# Patient Record
Sex: MALE | Race: BLACK OR AFRICAN AMERICAN | NOT HISPANIC OR LATINO | URBAN - METROPOLITAN AREA
[De-identification: names, ages, dates, MRNs, and addresses within clinical notes are randomized per-mention and may not be internally consistent; named-entity substitution may affect disease eponyms.]

---

## 2017-09-27 ENCOUNTER — OFFICE (OUTPATIENT)
Dept: URBAN - METROPOLITAN AREA CLINIC 78 | Facility: CLINIC | Age: 22
End: 2017-09-27

## 2017-09-27 VITALS
HEART RATE: 105 BPM | TEMPERATURE: 99.8 F | SYSTOLIC BLOOD PRESSURE: 133 MMHG | WEIGHT: 65 LBS | DIASTOLIC BLOOD PRESSURE: 96 MMHG | HEIGHT: 62 IN

## 2017-09-27 DIAGNOSIS — R10.11 RIGHT UPPER QUADRANT PAIN: ICD-10-CM

## 2017-09-27 DIAGNOSIS — K21.9 GASTRO-ESOPHAGEAL REFLUX DISEASE WITHOUT ESOPHAGITIS: ICD-10-CM

## 2017-09-27 PROCEDURE — 99244 OFF/OP CNSLTJ NEW/EST MOD 40: CPT

## 2017-09-27 RX ORDER — PANTOPRAZOLE SODIUM 40 MG/1
TABLET, DELAYED RELEASE ORAL
Qty: 90 | Refills: 3 | Status: COMPLETED
End: 2021-07-26 | Stop reason: SDUPTHER

## 2017-09-27 RX ORDER — POLYETHYLENE GLYCOL 3350 17 G/17G
POWDER, FOR SOLUTION ORAL
Qty: 1 | Refills: 5 | Status: ACTIVE
Start: 2017-09-27

## 2018-11-08 ENCOUNTER — OFFICE (OUTPATIENT)
Dept: URBAN - METROPOLITAN AREA CLINIC 33 | Facility: CLINIC | Age: 23
End: 2018-11-08
Payer: MEDICAID

## 2018-11-08 VITALS — WEIGHT: 65 LBS | HEIGHT: 62 IN | TEMPERATURE: 97.7 F

## 2018-11-08 DIAGNOSIS — K21.9 GASTRO-ESOPHAGEAL REFLUX DISEASE WITHOUT ESOPHAGITIS: ICD-10-CM

## 2018-11-08 PROCEDURE — 99214 OFFICE O/P EST MOD 30 MIN: CPT

## 2018-11-08 RX ORDER — PANTOPRAZOLE SODIUM 40 MG/1
TABLET, DELAYED RELEASE ORAL
Qty: 90 | Refills: 3 | Status: COMPLETED
End: 2021-07-26 | Stop reason: SDUPTHER

## 2020-04-21 ENCOUNTER — TELEHEALTH PROVIDED OTHER THAN IN PATIENT'S HOME (OUTPATIENT)
Dept: URBAN - METROPOLITAN AREA TELEHEALTH 3 | Facility: TELEHEALTH | Age: 25
End: 2020-04-21
Payer: MEDICAID

## 2020-04-21 VITALS — HEIGHT: 62 IN

## 2020-04-21 DIAGNOSIS — K21.9 GASTRO-ESOPHAGEAL REFLUX DISEASE WITHOUT ESOPHAGITIS: ICD-10-CM

## 2020-04-21 PROCEDURE — 99214 OFFICE O/P EST MOD 30 MIN: CPT | Mod: 95 | Performed by: INTERNAL MEDICINE

## 2020-04-21 RX ORDER — PANTOPRAZOLE SODIUM 40 MG/1
TABLET, DELAYED RELEASE ORAL
Qty: 90 | Refills: 3 | Status: COMPLETED
End: 2021-07-26 | Stop reason: SDUPTHER

## 2020-04-21 NOTE — SERVICENOTES
Duration of visit was 30 min.  Patient's visit was conducted through zoom telecommunication. Patient consented before the start of visit as to understanding of privacy concerns, possible technological failure, and their responsibility of carrying out instructions of plan.

## 2020-04-21 NOTE — SERVICEHPINOTES
PATIENT VERIFIED BY DATE OF BIRTH AND NAME. Patient has been consented for this telecommunication visit. Entire visit was done in presence of his mother.  Patient has CP so mother asked him questions I posed and then let me know his response.  Has been doing well on pantoprazole 40 mg per day.  No further abdominal pain episodes.  NO abdominal pain, nausea, or emesis.  Good po intake. Has not complained of stomach pain.  Would like a refill of medication.BRRecently started gabapentin for back pain, has not cuased any issues.DANDY asked his mother the following ROS:Allergic/Immunologic: Denies eye irritation, reactions, sneezing. BRCardiovascular: Denies palpitation/fluttering of heart, pain, shortness of breath while exercising. BRENMT: Denies blurred vision, irritation from light, itching, nose blocked, painful eyes, post nasal drip, pressure in ears, rhinitis (running nose), sores in mouth, teeth hurt. BREndocrine: Denies cold intolerance, hair loss/growth, heat intolerance, hot flashes. BRGastrointestinal:Denies constipation, diarrhea, reflux (heartburn), rectal bleeding. BRGenitourinary: Denies hesitation when urinating, urination at night. BRHematologic/Lymphatic: Denies bleeds easily, night sweats, weight loss. BRIntegumentary: Denies bleeding, dry skin, itchy skin, lesions, rash. BRMusculoskeletal:Denies soreness, weakness. BRNeurological: Denies abnormal movements, dizziness/vertigo, fainting, ringing in ears, twitch. BRPsychiatric: Denies Anxiety, depression, Loss of sleep, mood swings, situational stress. BRRespiratory: Denies cough, shortness of breath while sitting, wheezing

## 2021-09-07 ENCOUNTER — TELEHEALTH PROVIDED OTHER THAN IN PATIENT'S HOME (OUTPATIENT)
Dept: URBAN - METROPOLITAN AREA TELEHEALTH 3 | Facility: TELEHEALTH | Age: 26
End: 2021-09-07
Payer: COMMERCIAL

## 2021-09-07 VITALS — HEIGHT: 62 IN | WEIGHT: 65 LBS

## 2021-09-07 DIAGNOSIS — K21.9 GASTRO-ESOPHAGEAL REFLUX DISEASE WITHOUT ESOPHAGITIS: ICD-10-CM

## 2021-09-07 PROCEDURE — 99213 OFFICE O/P EST LOW 20 MIN: CPT | Mod: 95 | Performed by: INTERNAL MEDICINE

## 2021-09-07 RX ORDER — PANTOPRAZOLE 40 MG/1
TABLET, DELAYED RELEASE ORAL
Qty: 90 | Refills: 3 | Status: ACTIVE

## 2021-09-07 NOTE — SERVICEHPINOTES
PATIENT VERIFIED BY DATE OF BIRTH AND NAME. Patient has been consented for this telecommunication visit. Entire visit was done in presence of his mother. Patient has CP so mother asked him questions I posed and then let me know his response. Has been doing well on pantoprazole 40 mg per day. No further abdominal pain episodes. NO abdominal pain, nausea, or emesis. Good po intake. Has not complained of stomach pain.Nakia asked his mother the following ROS:Allergic/Immunologic: Denies eye irritation, reactions, sneezing.brCardiovascular: Denies palpitation/fluttering of heart, pain, shortness of breath while exercising.brENMT: Denies blurred vision, irritation from light, itching, nose blocked, painful eyes, post nasal drip, pressure in ears, rhinitis (running nose), sores in mouth, teeth hurt.brEndocrine: Denies cold intolerance, hair loss/growth, heat intolerance, hot flashes.brGastrointestinal:Denies constipation, diarrhea, reflux (heartburn), rectal bleeding.brGenitourinary: Denies hesitation when urinating, urination at night.brHematologic/Lymphatic: Denies bleeds easily, night sweats, weight loss.brIntegumentary: Denies bleeding, dry skin, itchy skin, lesions, rash.brMusculoskeletal:Denies soreness, weakness.brNeurological: Denies abnormal movements, dizziness/vertigo, fainting, ringing in ears, twitch.brPsychiatric: Denies Anxiety, depression, Loss of sleep, mood swings, situational stress.brRespiratory: Denies cough, shortness of breath while sitting, wheezingTakes pantoprazole daily which has controlled his heartburn verywell.  Says no longer having heartburn.  Eating well.  No nausea, emesis, melena.

## 2022-11-22 ENCOUNTER — OFFICE (OUTPATIENT)
Dept: URBAN - METROPOLITAN AREA CLINIC 79 | Facility: CLINIC | Age: 27
End: 2022-11-22
Payer: MEDICAID

## 2022-11-22 VITALS — WEIGHT: 67 LBS | HEIGHT: 62 IN | TEMPERATURE: 97.9 F

## 2022-11-22 DIAGNOSIS — K21.9 GASTRO-ESOPHAGEAL REFLUX DISEASE WITHOUT ESOPHAGITIS: ICD-10-CM

## 2022-11-22 PROCEDURE — 99213 OFFICE O/P EST LOW 20 MIN: CPT | Performed by: INTERNAL MEDICINE

## 2022-11-22 RX ORDER — PANTOPRAZOLE 40 MG/1
TABLET, DELAYED RELEASE ORAL
Qty: 90 | Refills: 3 | Status: ACTIVE

## 2023-09-18 ENCOUNTER — OFFICE (OUTPATIENT)
Dept: URBAN - METROPOLITAN AREA CLINIC 79 | Facility: CLINIC | Age: 28
End: 2023-09-18
Payer: COMMERCIAL

## 2023-09-18 VITALS
HEIGHT: 62 IN | DIASTOLIC BLOOD PRESSURE: 99 MMHG | TEMPERATURE: 97.7 F | SYSTOLIC BLOOD PRESSURE: 130 MMHG | HEART RATE: 97 BPM | WEIGHT: 70 LBS

## 2023-09-18 DIAGNOSIS — K59.09 OTHER CONSTIPATION: ICD-10-CM

## 2023-09-18 DIAGNOSIS — K21.9 GASTRO-ESOPHAGEAL REFLUX DISEASE WITHOUT ESOPHAGITIS: ICD-10-CM

## 2023-09-18 PROCEDURE — 99214 OFFICE O/P EST MOD 30 MIN: CPT | Performed by: INTERNAL MEDICINE

## 2023-09-18 RX ORDER — POLYETHYLENE GLYCOL 3350 17 G/17G
POWDER, FOR SOLUTION ORAL
Qty: 1 | Refills: 5 | Status: ACTIVE
Start: 2023-09-18

## 2023-09-18 RX ORDER — PANTOPRAZOLE 40 MG/1
TABLET, DELAYED RELEASE ORAL
Qty: 90 | Refills: 3 | Status: ACTIVE

## 2024-06-12 ENCOUNTER — OFFICE VISIT (OUTPATIENT)
Dept: URGENT CARE | Facility: URGENT CARE | Age: 29
End: 2024-06-12
Payer: COMMERCIAL

## 2024-06-12 VITALS
HEART RATE: 89 BPM | OXYGEN SATURATION: 94 % | DIASTOLIC BLOOD PRESSURE: 70 MMHG | TEMPERATURE: 98.8 F | HEIGHT: 60 IN | SYSTOLIC BLOOD PRESSURE: 107 MMHG | RESPIRATION RATE: 24 BRPM | BODY MASS INDEX: 13.35 KG/M2 | WEIGHT: 68 LBS

## 2024-06-12 DIAGNOSIS — M25.412 PAIN AND SWELLING OF LEFT SHOULDER: Primary | ICD-10-CM

## 2024-06-12 DIAGNOSIS — M25.476 SWELLING OF FOOT JOINT, UNSPECIFIED LATERALITY: ICD-10-CM

## 2024-06-12 DIAGNOSIS — Z86.69 HISTORY OF CEREBRAL PALSY: ICD-10-CM

## 2024-06-12 DIAGNOSIS — M25.512 PAIN AND SWELLING OF LEFT SHOULDER: Primary | ICD-10-CM

## 2024-06-12 DIAGNOSIS — Z51.81 THERAPEUTIC DRUG MONITORING: ICD-10-CM

## 2024-06-12 PROBLEM — M24.512: Chronic | Status: ACTIVE | Noted: 2019-11-01

## 2024-06-12 PROBLEM — M24.529 CONTRACTURE OF UPPER ARM JOINT: Status: ACTIVE | Noted: 2019-11-01

## 2024-06-12 PROBLEM — K11.7 DISTURBANCE OF SALIVARY SECRETION: Status: ACTIVE | Noted: 2024-06-12

## 2024-06-12 PROBLEM — G40.909 EPILEPSY (CMS/HCC): Status: ACTIVE | Noted: 2023-06-25

## 2024-06-12 PROBLEM — M24.511: Chronic | Status: ACTIVE | Noted: 2019-11-01

## 2024-06-12 LAB
ALBUMIN SERPL-MCNC: 3.7 G/DL (ref 3.3–5.5)
ALP SERPL-CCNC: 62 U/L (ref 45–115)
ALT SERPL-CCNC: 15 U/L (ref 10–47)
ANION GAP SERPL CALC-SCNC: 12 MMOL/L (ref 3–11)
AST SERPL-CCNC: 31 U/L
BASOPHILS # BLD AUTO: 0.01 10*3/UL
BASOPHILS NFR BLD AUTO: 0.2 % (ref 0–2)
BILIRUB SERPL-MCNC: 0.7 MG/DL (ref 0.2–1.4)
BUN SERPL-MCNC: 9 MG/DL (ref 7–25)
CALCIUM ALBUM COR SERPL-MCNC: 10 MG/DL (ref 8.6–10.3)
CALCIUM SERPL-MCNC: 9.8 MG/DL (ref 8.6–10.3)
CHLORIDE SERPL-SCNC: 101 MMOL/L (ref 98–107)
CO2 SERPL-SCNC: 32 MMOL/L (ref 21–32)
CREAT SERPL-MCNC: 0.4 MG/DL (ref 0.7–1.3)
CRP SERPL-MCNC: 2.6 MG/L
EGFRCR SERPLBLD CKD-EPI 2021: 153 ML/MIN/1.73M*2
EOSINOPHIL # BLD AUTO: 0.31 10*3/UL
EOSINOPHIL NFR BLD AUTO: 4.5 % (ref 0–3)
ERYTHROCYTE [DISTWIDTH] IN BLOOD BY AUTOMATED COUNT: 13.1 % (ref 11.5–15)
ERYTHROCYTE [SEDIMENTATION RATE] IN BLOOD: 14 MM/HR
GLUCOSE SERPL-MCNC: 83 MG/DL (ref 70–105)
HCT VFR BLD AUTO: 43 % (ref 38–50)
HGB BLD-MCNC: 13.9 G/DL (ref 13.2–17.2)
LYMPHOCYTES # BLD AUTO: 2.56 10*3/UL
LYMPHOCYTES NFR BLD AUTO: 37.4 % (ref 15–47)
MCH RBC QN AUTO: 28.3 PG (ref 29–34)
MCHC RBC AUTO-ENTMCNC: 32.4 G/DL (ref 32–36)
MCV RBC AUTO: 87.2 FL (ref 82–97)
MONOCYTES # BLD AUTO: 0.66 10*3/UL
MONOCYTES NFR BLD AUTO: 9.7 % (ref 5–13)
NEUTROPHILS # BLD AUTO: 3.3 10*3/UL
NEUTROPHILS NFR BLD AUTO: 48.2 % (ref 46–70)
PLATELET # BLD AUTO: 246 10*3/UL (ref 130–350)
PMV BLD AUTO: 7.5 FL (ref 6.9–10.8)
POTASSIUM SERPL-SCNC: 4.4 MMOL/L (ref 3.5–5.1)
PROT SERPL-MCNC: 7.3 G/DL (ref 6.4–8.1)
RBC # BLD AUTO: 4.93 10*6/ΜL (ref 4.1–5.8)
SODIUM SERPL-SCNC: 145 MMOL/L (ref 128–145)
VALPROATE SERPL-MCNC: 94 UG/ML (ref 50–125)
WBC # BLD AUTO: 6.9 10*3/UL (ref 3.7–9.6)

## 2024-06-12 PROCEDURE — 85652 RBC SED RATE AUTOMATED: CPT | Performed by: NURSE PRACTITIONER

## 2024-06-12 PROCEDURE — 80053 COMPREHEN METABOLIC PANEL: CPT | Performed by: NURSE PRACTITIONER

## 2024-06-12 PROCEDURE — 86140 C-REACTIVE PROTEIN: CPT | Performed by: NURSE PRACTITIONER

## 2024-06-12 PROCEDURE — 85025 COMPLETE CBC W/AUTO DIFF WBC: CPT | Performed by: NURSE PRACTITIONER

## 2024-06-12 PROCEDURE — 99204 OFFICE O/P NEW MOD 45 MIN: CPT | Performed by: NURSE PRACTITIONER

## 2024-06-12 PROCEDURE — 36415 COLL VENOUS BLD VENIPUNCTURE: CPT | Performed by: NURSE PRACTITIONER

## 2024-06-12 PROCEDURE — 80164 ASSAY DIPROPYLACETIC ACD TOT: CPT | Performed by: NURSE PRACTITIONER

## 2024-06-12 RX ORDER — ACETAMINOPHEN 500 MG
1000 TABLET ORAL DAILY
COMMUNITY

## 2024-06-12 RX ORDER — MIDAZOLAM 5 MG/.1ML
1 SPRAY NASAL
COMMUNITY
Start: 2023-06-22

## 2024-06-12 RX ORDER — GABAPENTIN 100 MG/1
200 CAPSULE ORAL 2 TIMES DAILY
COMMUNITY
Start: 2024-05-10

## 2024-06-12 RX ORDER — MONTELUKAST SODIUM 5 MG/1
5 TABLET, CHEWABLE ORAL NIGHTLY
COMMUNITY
Start: 2024-05-31

## 2024-06-12 RX ORDER — TRIPROLIDINE/PSEUDOEPHEDRINE 2.5MG-60MG
250 TABLET ORAL ONCE
Status: COMPLETED | OUTPATIENT
Start: 2024-06-12 | End: 2024-06-12

## 2024-06-12 RX ORDER — LEVETIRACETAM 500 MG/1
250 TABLET ORAL 2 TIMES DAILY
COMMUNITY
Start: 2023-10-28

## 2024-06-12 RX ORDER — BACLOFEN 20 MG/1
30 TABLET ORAL 3 TIMES DAILY
COMMUNITY
Start: 2023-11-28

## 2024-06-12 RX ORDER — DIVALPROEX SODIUM 125 MG/1
250 CAPSULE, COATED PELLETS ORAL 2 TIMES DAILY
COMMUNITY
Start: 2023-07-31

## 2024-06-12 RX ORDER — DIAZEPAM 2 MG/1
1 TABLET ORAL 2 TIMES DAILY
COMMUNITY
Start: 2024-01-29

## 2024-06-12 RX ORDER — PANTOPRAZOLE SODIUM 40 MG/1
40 TABLET, DELAYED RELEASE ORAL DAILY
COMMUNITY

## 2024-06-12 RX ADMIN — Medication 250 MG: at 15:15

## 2024-06-12 NOTE — PATIENT INSTRUCTIONS
1.  Patient's labs are stable so far.  - CBC does not reveal a significant elevation in white count.  This is reassuring in terms of assessing for infection.  - Metabolic panel shows stable kidney function,  Liver function, and electrolytes.   -Depakote level is 94, which is normal.  -Inflammatory markers are normal    The exact cause of patient's symptoms is unknown at this time.  I am reassured by his workup thus far.  - Recommend treating the inflammation and pain with NSAIDs.  Patient was given a dose of ibuprofen today at 3:20 P.M. continue with this every 4-6 hours for the next few days  - May also do cool compresses to the affected areas.  - Patient may also consider taking some Zyrtec, chewable children's tablet once to twice a day as this inflammation may be reactive in nature as well    Patient develops any fever, vomiting, respiratory symptoms, changes in mentation/mood/activity, is not eating or drinking,  recommend patient be reevaluated in the clinic setting.  You guys are more than welcome to come back into urgent care for reevaluation any point.

## 2024-06-12 NOTE — PROGRESS NOTES
Subjective   Chief Complaint   Patient presents with    Pain     19  [place]; pt from [place] w/ hx of spastic quadriplegic CP; pt is nonverbal ; per Jany/Haritha(mom) pt c/o left shoulder pain and some redness on the shoulder joint area as of yesterday ; also has some right foot swelling on the top padding and is slightly red and swollen; pt can give physical commands to let people know for Yes' or no answers; this morning noticed left foot swelling; [place] since then and did take him in for a right hand pain issue but that's resolved;         HPI    Hernesto Blakely is a 28 y.o. male who presents for skin/joint redness and concerns for infection. Patient nonverbal with history of CP, wheelchair bound, and mother at bedside to assist with history and communication today.  Patient's mother states that she noticed some localized swelling and redness to the dorsal aspect of patient's right foot a couple of days ago.  She states that she also noticed a spot to the anterior left shoulder that appeared red and swollen and patient complained that it was hurting.  Yesterday she noticed some similar swelling appearing to the left dorsal aspect of the distal foot.  Patient wears a shoe to the right foot but does not wear one to the left, citing that he typically will use the joystick on his wheelchair with his left foot.  She states patient has not sustained any traumatic injury to these areas, and he is wheelchair-bound.  She is unsure the exact cause of the symptoms he has no fever or respiratory symptoms.  Patient's  appetite and behavior overall has been normal per his baseline.     A couple of weeks ago, patient had similar swelling to the right hand, and he was seen in the clinic setting where he had x-rays performed.  There was no evidence of fracture or underlying issue.  They were concerned for possible cellulitis, but patient was not treated with antibiotics and symptoms seemed to spontaneously  resolve.     Patient has had no breakthrough seizures activity. Patient recently had increase of his Depakote, and patient's mother wonders if we can check this level.  They have decreased his Keppra and subsequently had increased this drug level.  Additionally, he is on baclofen, Valium, gabapentin.    Patient and family are vacationing from [place]. Some of his medical records are available for review.     The following have been reviewed and updated as appropriate in this visit:   Problems         Allergies   Allergen Reactions    Bee Pollen Itching    Zoloft [Sertraline]      Night sweats, body shaking, trouble sleeping     Current Outpatient Medications   Medication Sig Dispense Refill    baclofen (LIORESAL) 20 mg tablet Take 1.5 tablets (30 mg total) by mouth 3 times daily      cholecalciferol, vitamin D3, 50 mcg (2,000 unit) capsule Take 1,000 Int'l Units by mouth daily      diazePAM (VALIUM) 2 mg tablet Take 0.5 tablets (1 mg total) by mouth 2 times daily      divalproex sprinkle (DEPAKOTE SPRINKLE) 125 mg DR capsule Take 2 capsules (250 mg total) by mouth 2 times daily      gabapentin (NEURONTIN) 100 mg capsule Take 2 capsules (200 mg total) by mouth 2 times daily      levETIRAcetam (KEPPRA) 500 mg tablet Take 0.5 tablets (250 mg total) by mouth 2 times daily      midazolam (Nayzilam) 5 mg/spray (0.1 mL) spray,non-aerosol Administer 1 spray into nostril(s)      montelukast (SINGULAIR) 5 mg chewable tablet Take 1 tablet (5 mg total) by mouth nightly      pantoprazole (PROTONIX) 40 mg EC tablet Take 1 tablet (40 mg total) by mouth daily       No current facility-administered medications for this visit.     Past Medical History:   Diagnosis Date    CP (cerebral palsy), spastic (CMS/HCC)      History reviewed. No pertinent surgical history.  Family History   Adopted: Yes   Family history unknown: Yes     Social History     Socioeconomic History    Marital status: Unknown   Tobacco Use    Smoking status: Never     Smokeless tobacco: Never   Vaping Use    Vaping status: Never Used   Substance and Sexual Activity    Alcohol use: Never    Drug use: Never     Social Determinants of Health     Tobacco Use: Low Risk  (6/12/2024)    Patient History     Smoking Tobacco Use: Never     Smokeless Tobacco Use: Never   Recent Concern: Tobacco Use - High Risk (5/10/2024)    Received from [facility]    Patient History     Smoking Tobacco Use: Never     Smokeless Tobacco Use: Current     Passive Exposure: Never   Alcohol Use: Patient Declined (4/2/2024)    Received from [facility]    AUDIT-C     Frequency of Alcohol Consumption: Patient declined     Average Number of Drinks: Patient declined     Frequency of Binge Drinking: Patient declined   Financial Resource Strain: Patient Declined (4/2/2024)    Received from [facility]    Overall Financial Resource Strain (CARDIA)     Difficulty of Paying Living Expenses: Patient declined   Food Insecurity: Patient Declined (4/2/2024)    Received from [facility]    Hunger Vital Sign     Worried About Running Out of Food in the Last Year: Patient declined     Ran Out of Food in the Last Year: Patient declined   Transportation Needs: Patient Declined (4/2/2024)    Received from [facility]    PRAPARE - Transportation     Lack of Transportation (Medical): Patient declined     Lack of Transportation (Non-Medical): Patient declined   Physical Activity: Unknown (4/2/2024)    Received from [facility]    Exercise Vital Sign     Days of Exercise per Week: Patient declined   Stress: Patient Declined (4/2/2024)    Received from [facility]    [employer] [place] [employer] - Occupational Stress Questionnaire     Feeling of Stress : Patient declined   Social Connections: Patient Declined (4/2/2024)    Received  from [facility]    Social Connection and Isolation Panel [NHANES]     Frequency of Communication with Friends and Family: Patient declined     Frequency of Social Gatherings with Friends and Family: Patient declined     Attends Anabaptist Services: Patient declined     Active Member of Clubs or Organizations: Patient declined     Attends Club or Organization Meetings: Patient declined     Marital Status: Patient declined   Depression: Not at risk (4/2/2024)    Received from [facility] and [facility]    PHQ-2     PHQ2 Score: 0   Utilities: Patient Declined (4/2/2024)    Received from [facility] Utilities     Threatened with loss of utilities: Patient declined       Review of Systems  Please see HPI    Objective     Vitals:  /70   Pulse 89   Temp 37.1 °C (98.8 °F) (Temporal)   Resp 24   Ht 1.524 m (5')   Wt 30.8 kg (68 lb)   SpO2 94%   BMI 13.28 kg/m²     Physical Exam  Vitals and nursing note reviewed.   Constitutional:       General: He is not in acute distress.     Appearance: He is not ill-appearing or toxic-appearing.      Comments: Patient sitting in wheechair. He is awake and alert, responding to conversations and physically responding to mother's questions. He has spasticity to the extremities. He is in no apparent acute distress, well hydrated appearing.    Cardiovascular:      Rate and Rhythm: Normal rate and regular rhythm.      Pulses: Normal pulses.      Heart sounds: Normal heart sounds.   Skin:     General: Skin is warm and dry.          Neurological:      Mental Status: He is alert. Mental status is at baseline.         Recent Results (from the past 4 hour(s))   Valproic acid level, total Blood, Venous    Collection Time: 06/12/24  2:11 PM   Result Value Ref Range    Valproic Acid, Total 94 50 - 125 ug/mL   CBC w/auto differential Blood, Venous    Collection Time: 06/12/24  2:11 PM   Result Value Ref Range    WBC  6.9 3.7 - 9.6 10*3/uL    RBC 4.93 4.10 - 5.80 10*6/µL    Hemoglobin 13.9 13.2 - 17.2 g/dL    Hematocrit 43.0 38.0 - 50.0 %    MCV 87.2 82.0 - 97.0 fL    MCH 28.3 (L) 29.0 - 34.0 pg    MCHC 32.4 32.0 - 36.0 g/dL    RDW 13.1 11.5 - 15.0 %    Platelets 246 130 - 350 10*3/uL    MPV 7.5 6.9 - 10.8 fL    Neutrophils% 48.2 46.0 - 70.0 %    Lymphocytes% 37.4 15.0 - 47.0 %    Monocytes% 9.7 5.0 - 13.0 %    Eosinophils% 4.5 (H) 0.0 - 3.0 %    Basophils% 0.2 0.0 - 2.0 %    ANC (auto diff) 3.30 10*3/UL    Lymphocytes Absolute 2.56 10*3/uL    Monocytes Absolute 0.66 10*3/uL    Eosinophils Absolute 0.31 10*3/uL    Basophils Absolute 0.01 10*3/uL   Comprehensive metabolic panel Blood, Venous    Collection Time: 06/12/24  2:11 PM   Result Value Ref Range    Sodium 145 128 - 145 mmol/L    Potassium 4.4 3.5 - 5.1 MMOL/L    Chloride 101 98 - 107 mmol/L    CO2 32 21 - 32 mmol/L    Anion Gap 12 (H) 3 - 11 mmol/L    BUN 9 7 - 25 mg/dL    Creatinine 0.40 (L) 0.70 - 1.30 mg/dL    Glucose 83 70 - 105 mg/dL    Calcium 9.8 8.6 - 10.3 mg/dL    AST 31 <40 U/L    ALT (SGPT) 15 10 - 47 U/L    Alkaline Phosphatase 62 45 - 115 U/L    Total Protein 7.3 6.4 - 8.1 g/dL    Albumin 3.7 3.3 - 5.5 g/dL    Total Bilirubin 0.70 0.20 - 1.40 mg/dL    Corrected Calcium 10.0 8.6 - 10.3 mg/dL    eGFR 153 >60 mL/min/1.73m*2   Sedimentation rate, automated Blood, Venous    Collection Time: 06/12/24  2:11 PM   Result Value Ref Range    Sed Rate 14 <=20 mm/hr   C-reactive protein (Inflammation) Blood, Venous    Collection Time: 06/12/24  2:11 PM   Result Value Ref Range    CRP 2.6 <=10.0 MG/L           Assessment/Plan   Diagnoses and all orders for this visit:    Pain and swelling of left shoulder  -     Valproic acid level, total Blood, Venous  -     CBC w/auto differential Blood, Venous  -     Comprehensive metabolic panel Blood, Venous  -     Sedimentation rate, automated Blood, Venous  -     C-reactive protein (Inflammation) Blood, Venous  -     ibuprofen  (ADVIL,MOTRIN) 100 mg/5 mL suspension 250 mg    Swelling of foot joint, unspecified laterality  -     Valproic acid level, total Blood, Venous  -     CBC w/auto differential Blood, Venous  -     Comprehensive metabolic panel Blood, Venous  -     Sedimentation rate, automated Blood, Venous  -     C-reactive protein (Inflammation) Blood, Venous  -     ibuprofen (ADVIL,MOTRIN) 100 mg/5 mL suspension 250 mg    History of cerebral palsy  -     Valproic acid level, total Blood, Venous  -     CBC w/auto differential Blood, Venous  -     Comprehensive metabolic panel Blood, Venous  -     Sedimentation rate, automated Blood, Venous  -     C-reactive protein (Inflammation) Blood, Venous    Therapeutic drug monitoring  -     Valproic acid level, total Blood, Venous  -     CBC w/auto differential Blood, Venous  -     Comprehensive metabolic panel Blood, Venous  -     Sedimentation rate, automated Blood, Venous  -     C-reactive protein (Inflammation) Blood, Venous        Discussion:    Patient's vitals are stable at this time.  He is well-hydrated, nontoxic-appearing, no acute distress at this time.  Patient does have some redness to the bilateral feet with swelling.  He does have a localized area of swelling and erythema to the anterior left shoulder as well as the  right lateral lower extremity.  To further evaluate the cause of this, recommend proceeding with serum diagnostics. We did discuss ordering some x-rays, particular of the shoulder, but mutual decision is made to hold off on this for the time being.     Patient's past visits notes reviewed from neurology.  He did have a visit with his neurologist this past May (5/10/24) where Keppra was decreased and Depakote was increased.  They have plans to follow-up in 1 to 2 months or sooner if needed.  I will check a Depakote level today as requested by family.    Patient's CBC reveals a stable white blood cell count of 6.2, slight eosinophilia at 4.5, overall stable  otherwise.  Metabolic panel reveals stable electrolytes, stable renal and hepatic functions.  ESR and CRP returned as well, showing stable findings.  Depakote level is also within normal parameters at 94.    Reviewed with patient and parent that  the exact cause of his symptoms is unknown at this time.  At this point, it does not appear to be consistent with an infectious process.  He does appear to have some inflammation, and advised that this may be something from a localized skin reaction to insect bites or other underlying process such as erythema nodosum.  Recommend NSAID therapy, and patient was given ibuprofen in office today.  Recommended continuing with ibuprofen as directed.  Advised that if this is some type of reaction, Zyrtec may also help with discomfort, advised children Zyrtec once to twice a day for the next 7 days.  Recommend they also apply cool compresses to the affected areas for 15 minutes at a time.  At this time, patient appears stable but reviewed that if he develops any worsening symptoms including but not limited to fever, vomiting, respiratory symptoms, change in mentation/mood/activity, eating or drinking, advised that they bring him back into the clinic setting for reevaluation.  I also advised him to schedule routine follow-up for primary care once they return home from their vacation.  Patient's parent states understanding of information instructions, agrees with plan of care at this time, questions answered    Patient Education: Ready to learn, no apparent learning barriers were identified; learning preference includes listening. Explained diagnosis and treatment plan; patient/caregiver expressed understanding of the content. All questions answered.       No follow-ups on file.  Mary Neri, CNP

## 2025-06-13 ENCOUNTER — TELEHEALTH PROVIDED IN PATIENT'S HOME (OUTPATIENT)
Dept: URBAN - METROPOLITAN AREA TELEHEALTH 3 | Facility: TELEHEALTH | Age: 30
End: 2025-06-13
Payer: COMMERCIAL

## 2025-06-13 VITALS — WEIGHT: 65 LBS | HEIGHT: 62 IN

## 2025-06-13 DIAGNOSIS — K59.09 OTHER CONSTIPATION: ICD-10-CM

## 2025-06-13 DIAGNOSIS — K21.9 GASTRO-ESOPHAGEAL REFLUX DISEASE WITHOUT ESOPHAGITIS: ICD-10-CM

## 2025-06-13 PROCEDURE — 99214 OFFICE O/P EST MOD 30 MIN: CPT | Mod: 95 | Performed by: INTERNAL MEDICINE

## 2025-06-13 RX ORDER — POLYETHYLENE GLYCOL 3350 17 G/17G
POWDER, FOR SOLUTION ORAL
Qty: 1 | Refills: 5 | Status: ACTIVE
Start: 2025-06-13

## 2025-06-13 RX ORDER — PANTOPRAZOLE SODIUM 20 MG/1
40 TABLET, DELAYED RELEASE ORAL
Qty: 90 | Refills: 3 | Status: ACTIVE
Start: 2025-06-13

## 2025-06-13 NOTE — SERVICEHPINOTES
PATIENT VERIFIED BY DATE OF BIRTH AND NAME. Patient has been consented for this telecommunication visit using [employer] application.  Has been refusing Miralax for a few weeks and developed some constipation so then restarted it and his mom notes that constipation has resolved.   His heartburn continues to be well controlled with the heartburn.